# Patient Record
Sex: MALE | Race: OTHER | HISPANIC OR LATINO | Employment: UNEMPLOYED | URBAN - METROPOLITAN AREA
[De-identification: names, ages, dates, MRNs, and addresses within clinical notes are randomized per-mention and may not be internally consistent; named-entity substitution may affect disease eponyms.]

---

## 2022-10-22 ENCOUNTER — HOSPITAL ENCOUNTER (EMERGENCY)
Facility: HOSPITAL | Age: 27
Discharge: HOME | End: 2022-10-22
Attending: EMERGENCY MEDICINE
Payer: MEDICARE

## 2022-10-22 VITALS
OXYGEN SATURATION: 99 % | WEIGHT: 315 LBS | TEMPERATURE: 98.5 F | SYSTOLIC BLOOD PRESSURE: 123 MMHG | RESPIRATION RATE: 16 BRPM | DIASTOLIC BLOOD PRESSURE: 79 MMHG | HEART RATE: 75 BPM

## 2022-10-22 DIAGNOSIS — T16.1XXA FOREIGN BODY OF RIGHT EAR, INITIAL ENCOUNTER: Primary | ICD-10-CM

## 2022-10-22 RX ORDER — KETOROLAC TROMETHAMINE 30 MG/ML
15 INJECTION, SOLUTION INTRAMUSCULAR; INTRAVENOUS ONCE
Status: COMPLETED | OUTPATIENT
Start: 2022-10-22 | End: 2022-10-22

## 2022-10-22 RX ORDER — CIPROFLOXACIN AND DEXAMETHASONE 3; 1 MG/ML; MG/ML
4 SUSPENSION/ DROPS AURICULAR (OTIC) ONCE
Status: COMPLETED | OUTPATIENT
Start: 2022-10-22 | End: 2022-10-22

## 2022-10-22 RX ORDER — AMOXICILLIN AND CLAVULANATE POTASSIUM 875; 125 MG/1; MG/1
1 TABLET, FILM COATED ORAL ONCE
Status: DISCONTINUED | OUTPATIENT
Start: 2022-10-22 | End: 2022-10-22

## 2022-10-22 RX ORDER — LIDOCAINE HYDROCHLORIDE 10 MG/ML
10 INJECTION, SOLUTION EPIDURAL; INFILTRATION; INTRACAUDAL; PERINEURAL ONCE
Status: COMPLETED | OUTPATIENT
Start: 2022-10-22 | End: 2022-10-22

## 2022-10-22 RX ORDER — OFLOXACIN 3 MG/ML
1 SOLUTION/ DROPS OPHTHALMIC ONCE
Status: DISCONTINUED | OUTPATIENT
Start: 2022-10-22 | End: 2022-10-22

## 2022-10-22 RX ADMIN — KETOROLAC TROMETHAMINE 15 MG: 30 INJECTION, SOLUTION INTRAMUSCULAR; INTRAVENOUS at 05:05

## 2022-10-22 RX ADMIN — LIDOCAINE HYDROCHLORIDE 10 ML: 10 INJECTION, SOLUTION EPIDURAL; INFILTRATION; INTRACAUDAL at 05:05

## 2022-10-22 RX ADMIN — CIPROFLOXACIN AND DEXAMETHASONE 4 DROP: 3; 1 SUSPENSION/ DROPS AURICULAR (OTIC) at 05:05

## 2022-10-22 NOTE — DISCHARGE INSTRUCTIONS
Return with any new or worsening symptoms  Administer 4 drops of Ciprofloxacin/dexamethasone into the right ear 2 times daily for 7 days

## 2022-10-27 NOTE — ED PROVIDER NOTES
History  Chief Complaint   Patient presents with   • Earache     Right ear - started tonight  No pain medication prior to coming  Raquel Guevara is a 27 y o  male with no pertinent past medical history presenting with right-sided ear pain  Patient has been experiencing pain for the past 2 hours  Patient does not recall any foreign bodies  No purulent drainage  Patient does not have history of diabetes  No changes to hearing  No further complaints at this time  None       Past Medical History:   Diagnosis Date   • Asthma    • Disease of thyroid gland    • Hypertension    • Psychiatric disorder        Past Surgical History:   Procedure Laterality Date   • HAND SURGERY Right        History reviewed  No pertinent family history  I have reviewed and agree with the history as documented  E-Cigarette/Vaping     E-Cigarette/Vaping Substances     Social History     Tobacco Use   • Smoking status: Never Smoker   • Smokeless tobacco: Current User   Substance Use Topics   • Alcohol use: Yes     Comment: socially   • Drug use: Never       Review of Systems   Constitutional: Negative for chills and fever  HENT: Positive for ear pain  Negative for ear discharge and sore throat  Eyes: Negative for pain and visual disturbance  Respiratory: Negative for cough and shortness of breath  Cardiovascular: Negative for chest pain and palpitations  Gastrointestinal: Negative for abdominal pain and vomiting  Genitourinary: Negative for dysuria and hematuria  Musculoskeletal: Negative for arthralgias and back pain  Skin: Negative for color change and rash  Neurological: Negative for seizures and syncope  All other systems reviewed and are negative  Physical Exam  Physical Exam  Vitals and nursing note reviewed  Constitutional:       Appearance: He is well-developed  HENT:      Head: Normocephalic and atraumatic        Left Ear: Tympanic membrane normal       Ears:      Comments: Foreign body noted in the ear canal near tympanic membrane surrounding erythematous changes  Noted to be cockroach in patient's ear  Eyes:      Conjunctiva/sclera: Conjunctivae normal    Cardiovascular:      Rate and Rhythm: Normal rate and regular rhythm  Heart sounds: No murmur heard  Pulmonary:      Effort: Pulmonary effort is normal  No respiratory distress  Breath sounds: Normal breath sounds  Abdominal:      Palpations: Abdomen is soft  Tenderness: There is no abdominal tenderness  Musculoskeletal:      Cervical back: Neck supple  Skin:     General: Skin is warm and dry  Neurological:      Mental Status: He is alert           Vital Signs  ED Triage Vitals [10/22/22 0347]   Temperature Pulse Respirations Blood Pressure SpO2   98 7 °F (37 1 °C) 83 18 138/79 97 %      Temp Source Heart Rate Source Patient Position - Orthostatic VS BP Location FiO2 (%)   Tympanic Monitor Sitting Left arm --      Pain Score       8           Vitals:    10/22/22 0347 10/22/22 0518   BP: 138/79 123/79   Pulse: 83 75   Patient Position - Orthostatic VS: Sitting          Visual Acuity      ED Medications  Medications   ketorolac (TORADOL) injection 15 mg (15 mg Intramuscular Given 10/22/22 0505)   lidocaine (PF) (XYLOCAINE-MPF) 1 % injection 10 mL (10 mL Infiltration Given 10/22/22 0505)   ciprofloxacin-dexamethasone (CIPRODEX) 0 3-0 1 % otic suspension 4 drop (4 drops Otic Given 10/22/22 0505)       Diagnostic Studies  Results Reviewed     None                 No orders to display              Procedures  Foreign Body - Orifice    Date/Time: 10/22/2022 5:00 AM  Performed by: Sandhya Kirby PA-C  Authorized by: Sandhya Kirby PA-C     Patient location:  ED  Other Assisting Provider: Yes (comment) (Attending Dr Faye Montoya)    Consent:     Consent obtained:  Verbal    Consent given by:  Patient    Risks discussed:  Bleeding, infection, TM perforation, damage to surrounding structures, need for surgical removal, worsening of condition, incomplete removal and pain    Alternatives discussed:  No treatment, delayed treatment and referral  Universal protocol:     Procedure explained and questions answered to patient or proxy's satisfaction: yes      Patient identity confirmed:  Verbally with patient  Location:     Location:  Ear    Ear location:  R ear  Pre-procedure details:     Imaging:  None  Procedure details:     Localization method:  Direct visualization    Removal mechanism:  Alligator forceps    Procedure complexity:  Complex    Foreign bodies recovered:  1    Description:  Cockroach    Intact foreign body removal: yes    Post-procedure details:     Confirmation:  No additional foreign bodies on visualization    Patient tolerance of procedure: Tolerated well, no immediate complications             ED Course                                             MDM  Number of Diagnoses or Management Options  Foreign body of right ear, initial encounter  Diagnosis management comments: Discussed possibility of retained foreign body and risk of infection  Patient demonstrated understanding  I discussed strict return criteria with him at bedside  Risk of Complications, Morbidity, and/or Mortality  Presenting problems: moderate  Diagnostic procedures: moderate  Management options: moderate    Patient Progress  Patient progress: improved      Disposition  Final diagnoses:   Foreign body of right ear, initial encounter     Time reflects when diagnosis was documented in both MDM as applicable and the Disposition within this note     Time User Action Codes Description Comment    10/22/2022  4:34 AM India Glez Add [T16  1XXA] Foreign body of right ear, initial encounter       ED Disposition     ED Disposition   Discharge    Condition   Stable    Date/Time   Sat Oct 22, 2022  4:33 AM    Laura Ortiz discharge to home/self care                 Follow-up Information     Follow up With Specialties Details Why Contact Info Additional Information    [facility] Emergency Department Emergency Medicine Go to  If symptoms worsen [address] [id number]  [facility] Emergency Department    [employer]  Call today To schedule an appointment for follow-up with primary care provider  [phone]             There are no discharge medications for this patient  No discharge procedures on file      PDMP Review     None          ED Provider  Electronically Signed by           Shakila Boyd PA-C  10/26/22 211

## 2023-10-26 ENCOUNTER — HOSPITAL ENCOUNTER (EMERGENCY)
Facility: HOSPITAL | Age: 28
Discharge: HOME | End: 2023-10-26
Attending: EMERGENCY MEDICINE

## 2023-10-26 ENCOUNTER — APPOINTMENT (EMERGENCY)
Dept: RADIOLOGY | Facility: HOSPITAL | Age: 28
End: 2023-10-26

## 2023-10-26 VITALS
TEMPERATURE: 97.9 F | HEART RATE: 74 BPM | OXYGEN SATURATION: 97 % | SYSTOLIC BLOOD PRESSURE: 135 MMHG | DIASTOLIC BLOOD PRESSURE: 75 MMHG | WEIGHT: 315 LBS | RESPIRATION RATE: 20 BRPM

## 2023-10-26 DIAGNOSIS — L03.90 CELLULITIS: Primary | ICD-10-CM

## 2023-10-26 PROCEDURE — 73630 X-RAY EXAM OF FOOT: CPT

## 2023-10-26 PROCEDURE — 99283 EMERGENCY DEPT VISIT LOW MDM: CPT

## 2023-10-26 PROCEDURE — 99284 EMERGENCY DEPT VISIT MOD MDM: CPT

## 2023-10-26 RX ORDER — NAPROXEN 375 MG/1
375 TABLET ORAL 2 TIMES DAILY WITH MEALS
Qty: 20 TABLET | Refills: 0 | Status: SHIPPED | OUTPATIENT
Start: 2023-10-26

## 2023-10-26 RX ORDER — CEPHALEXIN 500 MG/1
500 CAPSULE ORAL EVERY 6 HOURS SCHEDULED
Qty: 20 CAPSULE | Refills: 0 | Status: SHIPPED | OUTPATIENT
Start: 2023-10-26 | End: 2023-10-31

## 2023-10-26 NOTE — ED PROVIDER NOTES
History  Chief Complaint   Patient presents with    Foot Pain     Pt came to ER with pain of first toe of left foot for three days. Pt denies trauma. Pt took tylenol at 56.      32year old male presenting today with concerns of left foot, 1st toe and medial foot, pain for the last 3 days. No fevers or chills. Atraumatic. No recent surgeries. No nausea, vomiting. States it is very painful to walk on. No pain up the leg or into the ankle. No wounds. None       Past Medical History:   Diagnosis Date    Asthma     Disease of thyroid gland     Hypertension     Psychiatric disorder        Past Surgical History:   Procedure Laterality Date    HAND SURGERY Right        History reviewed. No pertinent family history. I have reviewed and agree with the history as documented. E-Cigarette/Vaping     E-Cigarette/Vaping Substances     Social History     Tobacco Use    Smoking status: Never    Smokeless tobacco: Current   Substance Use Topics    Alcohol use: Yes     Comment: socially    Drug use: Never       Review of Systems   Constitutional:  Negative for chills and fever. HENT:  Negative for ear pain and sore throat. Eyes:  Negative for pain and visual disturbance. Respiratory:  Negative for cough and shortness of breath. Cardiovascular:  Negative for chest pain and palpitations. Gastrointestinal:  Negative for abdominal pain and vomiting. Genitourinary:  Negative for dysuria and hematuria. Musculoskeletal:  Positive for arthralgias and gait problem. Negative for back pain and joint swelling. Skin:  Positive for color change and rash. Neurological:  Negative for dizziness, seizures, syncope and headaches. All other systems reviewed and are negative. Physical Exam  Physical Exam  Vitals and nursing note reviewed. Constitutional:       General: He is not in acute distress. Appearance: He is well-developed. HENT:      Head: Normocephalic and atraumatic.    Eyes: Conjunctiva/sclera: Conjunctivae normal.   Cardiovascular:      Rate and Rhythm: Normal rate and regular rhythm. Heart sounds: No murmur heard. Pulmonary:      Effort: Pulmonary effort is normal. No respiratory distress. Breath sounds: Normal breath sounds. Abdominal:      Palpations: Abdomen is soft. Tenderness: There is no abdominal tenderness. Musculoskeletal:         General: Tenderness (erythema on dorsal aspect of foot as well as medial and on 1st toe. No wounds/bleeding. no nail infection.) present. No swelling. Cervical back: Neck supple. Skin:     General: Skin is warm and dry. Capillary Refill: Capillary refill takes less than 2 seconds. Findings: Erythema present. Neurological:      Mental Status: He is alert. Psychiatric:         Mood and Affect: Mood normal.         Vital Signs  ED Triage Vitals [10/26/23 1644]   Temperature Pulse Respirations Blood Pressure SpO2   97.9 °F (36.6 °C) 74 20 135/75 97 %      Temp Source Heart Rate Source Patient Position - Orthostatic VS BP Location FiO2 (%)   Oral Monitor Sitting Left arm --      Pain Score       --           Vitals:    10/26/23 1644   BP: 135/75   Pulse: 74   Patient Position - Orthostatic VS: Sitting         Visual Acuity      ED Medications  Medications - No data to display    Diagnostic Studies  Results Reviewed       None                   XR foot 3+ views LEFT   ED Interpretation by Rubio Mcintyre PA-C (10/26 1718)   No acute fracture or dislocation. Procedures  Procedures         ED Course                               SBIRT 22yo+      Flowsheet Row Most Recent Value   Initial Alcohol Screen: US AUDIT-C     1. How often do you have a drink containing alcohol? 2 Filed at: 10/26/2023 1643   2. How many drinks containing alcohol do you have on a typical day you are drinking? 6 Filed at: 10/26/2023 1643   3a. Male UNDER 65: How often do you have five or more drinks on one occasion?  1 Filed at: 10/26/2023 1643   3b. FEMALE Any Age, or MALE 65+: How often do you have 4 or more drinks on one occassion? 0 Filed at: 10/26/2023 1643   Audit-C Score 9 Filed at: 10/26/2023 1643   Full Alcohol Screen: US AUDIT    4. How often during the last year have you found that you were not able to stop drinking once you had started? 0 Filed at: 10/26/2023 1643   5. How often during past year have you failed to do what was normally expected of you because of drinking? 0 Filed at: 10/26/2023 1643   6. How often in past year have you needed a first drink in the morning to get yourself going after a heavy drinking session? 0 Filed at: 10/26/2023 1643   7. How often in past year have you had feeling of guilt or remorse after drinking? 0 Filed at: 10/26/2023 1643   8. How often in past year have you been unable to remember what happened night before because you had been drinking? 0 Filed at: 10/26/2023 1643   9. Have you or someone else been injured as a result of your drinking? 0 Filed at: 10/26/2023 1643   10. Has a relative, friend, doctor or other health worker been concerned about your drinking and suggested you cut down?  0 Filed at: 10/26/2023 1643   AUDIT Total Score 9 Filed at: 10/26/2023 1643   JERI: How many times in the past year have you. .. Used an illegal drug or used a prescription medication for non-medical reasons? Never Filed at: 10/26/2023 1643                      Medical Decision Making  32year old male presenting today with concerns of foot and toe pain. XR to rule out fracture. High suspicion of infection given erythema, poorly demarcated. XR did not reveal and acute fractures or dislocations. No abscess. Mild soft tissue swelling. Will treat with antibiotics and naproxen. Supplied patient with surgical shoe. Strict return precautions discussed. Patient at time of discharge well-appearing in no acute distress, all questions answered. Patient agreeable to plan.   Patient's vitals, lab/imaging results, diagnosis, and treatment plan were discussed with the patient. All new/changed medications were discussed with patient, specifically, route of administration, how often and when to take, and where they can be picked up. Strict return precautions as well as close follow up with PCP was discussed with the patient and the patient was agreeable to my recommendations. Patient verbally acknowledged understanding of the above communications. All labs reviewed and utilized in the medical decision making process (if labs were ordered). Portions of the record may have been created with voice recognition software. Occasional wrong word or "sound a like" substitutions may have occurred due to the inherent limitations of voice recognition software. Read the chart carefully and recognize, using context, where substitutions have occurred. Amount and/or Complexity of Data Reviewed  Radiology: ordered and independent interpretation performed. Risk  Prescription drug management. Disposition  Final diagnoses:   Cellulitis     Time reflects when diagnosis was documented in both MDM as applicable and the Disposition within this note       Time User Action Codes Description Comment    10/26/2023  5:19 PM Vandana Reed, [address] [G01.24] Cellulitis           ED Disposition       ED Disposition   Discharge    Condition   Stable    Date/Time   Thu Oct 26, 2023 [address] [place] [address] discharge to home/self care.                    Follow-up Information       Follow up With Specialties Details Why Contact Info Additional [address] [facility] Emergency Medicine Go to  If symptoms worsen [address] [phone] [address] [facility] Emergency Department            Patient's Medications   Discharge Prescriptions    CEPHALEXIN (KEFLEX) 500 MG CAPSULE    Take 1 capsule (500 mg total) by mouth every 6 (six) hours for 5 days       Start Date: 10/26/2023End Date: 10/31/2023       Order Dose: 500 mg       Quantity: 20 capsule    Refills: 0    NAPROXEN (NAPROSYN) 375 MG TABLET    Take 1 tablet (375 mg total) by mouth 2 (two) times a day with meals       Start Date: 10/26/2023End Date: --       Order Dose: 375 mg       Quantity: 20 tablet    Refills: 0       No discharge procedures on file.     PDMP Review       None            ED Provider  Electronically Signed by             Marc Lundberg PA-C  10/26/23 3737

## 2024-06-17 ENCOUNTER — APPOINTMENT (EMERGENCY)
Dept: RADIOLOGY | Facility: HOSPITAL | Age: 29
End: 2024-06-17
Payer: MEDICARE

## 2024-06-17 ENCOUNTER — HOSPITAL ENCOUNTER (EMERGENCY)
Facility: HOSPITAL | Age: 29
Discharge: HOME | End: 2024-06-17
Attending: INTERNAL MEDICINE | Admitting: INTERNAL MEDICINE
Payer: MEDICARE

## 2024-06-17 VITALS
SYSTOLIC BLOOD PRESSURE: 124 MMHG | WEIGHT: 315 LBS | OXYGEN SATURATION: 97 % | HEART RATE: 73 BPM | RESPIRATION RATE: 16 BRPM | TEMPERATURE: 97.6 F | DIASTOLIC BLOOD PRESSURE: 58 MMHG

## 2024-06-17 DIAGNOSIS — M79.672 ACUTE FOOT PAIN, LEFT: Primary | ICD-10-CM

## 2024-06-17 PROCEDURE — 73660 X-RAY EXAM OF TOE(S): CPT

## 2024-06-17 PROCEDURE — 96372 THER/PROPH/DIAG INJ SC/IM: CPT

## 2024-06-17 PROCEDURE — 99283 EMERGENCY DEPT VISIT LOW MDM: CPT

## 2024-06-17 PROCEDURE — 99284 EMERGENCY DEPT VISIT MOD MDM: CPT | Performed by: INTERNAL MEDICINE

## 2024-06-17 PROCEDURE — 73620 X-RAY EXAM OF FOOT: CPT

## 2024-06-17 RX ORDER — NAPROXEN 500 MG/1
500 TABLET ORAL 2 TIMES DAILY WITH MEALS
Qty: 30 TABLET | Refills: 0 | Status: SHIPPED | OUTPATIENT
Start: 2024-06-17

## 2024-06-17 RX ORDER — KETOROLAC TROMETHAMINE 30 MG/ML
30 INJECTION, SOLUTION INTRAMUSCULAR; INTRAVENOUS ONCE
Status: COMPLETED | OUTPATIENT
Start: 2024-06-17 | End: 2024-06-17

## 2024-06-17 RX ADMIN — KETOROLAC TROMETHAMINE 30 MG: 30 INJECTION, SOLUTION INTRAMUSCULAR; INTRAVENOUS at 11:31

## 2024-06-17 NOTE — DISCHARGE INSTRUCTIONS
We will call you regarding the final x-ray read of left foot and left toe.  On my read, I did not find see any fractures or dislocation.    Use Tylenol, Motrin and ice for pain relief.    Avoid activities and sports that may cause further injury, stress or pain.     If pain continues or worsens or fails to improve, follow up with Orthopedics, PCP or in the ER

## 2024-06-17 NOTE — ED PROVIDER NOTES
History  Chief Complaint   Patient presents with    Toe Pain     Non-traumatic pain to left great toe, started this am       Toe Pain    28-year-old man presents to ED evaluation of left foot and left great toe pain.  He states that started immediately prior to arrival and his wife drove him to the ED.  He reports pain starts over his left foot and radiates towards his left toe.  He denies any fall or trauma.  He denies any new activity.  He has not tried any medications or therapies for his symptoms.  No personal history or family history of gout.  Denies any joint pain.  No skin rashes.  No fevers or chills.  No other complaints or concerns.    Prior to Admission Medications   Prescriptions Last Dose Informant Patient Reported? Taking?   naproxen (NAPROSYN) 375 mg tablet   No No   Sig: Take 1 tablet (375 mg total) by mouth 2 (two) times a day with meals      Facility-Administered Medications: None       Past Medical History:   Diagnosis Date    Asthma     Disease of thyroid gland     Hypertension     Psychiatric disorder        Past Surgical History:   Procedure Laterality Date    HAND SURGERY Right        History reviewed. No pertinent family history.  I have reviewed and agree with the history as documented.    E-Cigarette/Vaping    E-Cigarette Use Current Some Day User      E-Cigarette/Vaping Substances     Social History     Tobacco Use    Smoking status: Never    Smokeless tobacco: Current   Vaping Use    Vaping status: Some Days   Substance Use Topics    Alcohol use: Yes     Comment: socially    Drug use: Never       Review of Systems   All other systems reviewed and are negative.      Physical Exam  Physical Exam  Constitutional:       Appearance: He is obese.   HENT:      Right Ear: External ear normal.      Left Ear: External ear normal.      Nose: Nose normal.      Mouth/Throat:      Mouth: Mucous membranes are moist.      Pharynx: Oropharynx is clear.   Eyes:      Conjunctiva/sclera: Conjunctivae  normal.   Cardiovascular:      Rate and Rhythm: Normal rate.      Pulses: Normal pulses.   Pulmonary:      Effort: Pulmonary effort is normal.   Abdominal:      General: There is no distension.   Musculoskeletal:      Right foot: Normal.      Left foot: Normal range of motion and normal capillary refill. Tenderness present. No laceration, bony tenderness or crepitus. Normal pulse.        Feet:    Skin:     General: Skin is warm.      Capillary Refill: Capillary refill takes less than 2 seconds.   Neurological:      General: No focal deficit present.      Mental Status: He is alert.   Psychiatric:         Mood and Affect: Mood normal.         Vital Signs  ED Triage Vitals   Temperature Pulse Respirations Blood Pressure SpO2   06/17/24 1040 06/17/24 1040 06/17/24 1040 06/17/24 1040 06/17/24 1040   97.6 °F (36.4 °C) 73 16 124/58 97 %      Temp Source Heart Rate Source Patient Position - Orthostatic VS BP Location FiO2 (%)   06/17/24 1040 06/17/24 1040 06/17/24 1040 06/17/24 1040 --   Oral Monitor Sitting Right arm       Pain Score       06/17/24 1131       10 - Worst Possible Pain           Vitals:    06/17/24 1040   BP: 124/58   Pulse: 73   Patient Position - Orthostatic VS: Sitting         Visual Acuity      ED Medications  Medications   ketorolac (TORADOL) injection 30 mg (30 mg Intramuscular Given 6/17/24 1131)       Diagnostic Studies  Results Reviewed       None                   XR toe great min 2 views LEFT    (Results Pending)   XR foot 2 vw left    (Results Pending)              Procedures  Procedures         ED Course  ED Course as of 06/17/24 1156   Mon Jun 17, 2024   1143 We will call you regarding the final x-ray read of left foot and left toe.  On my read, I did not find see any fractures or dislocation.    Use Tylenol, Motrin and ice for pain relief.    Avoid activities and sports that may cause further injury, stress or pain.     If pain continues or worsens or fails to improve, follow up with  Orthopedics, PCP or in the ER                                             Medical Decision Making  Parental diagnose includes acute fracture, acute dislocation, sprain, strain.  Less likely to be gout or pseudogout.      Take naproxen twice daily.  Follow-up with podiatry.  Continue to wear surgical shoe    Amount and/or Complexity of Data Reviewed  Radiology: ordered.    Risk  Prescription drug management.             Disposition  Final diagnoses:   Acute foot pain, left     Time reflects when diagnosis was documented in both MDM as applicable and the Disposition within this note       Time User Action Codes Description Comment    6/17/2024 11:42 AM Sophie Capone [M79.672] Acute foot pain, left           ED Disposition       ED Disposition   Discharge    Condition   Stable    Date/Time   Mon Jun 17, 2024 11:40 AM    Comment   Jewel Vargas discharge to home/self care.                   Follow-up Information    None         Patient's Medications   Discharge Prescriptions    NAPROXEN (NAPROSYN) 500 MG TABLET    Take 1 tablet (500 mg total) by mouth 2 (two) times a day with meals       Start Date: 6/17/2024 End Date: --       Order Dose: 500 mg       Quantity: 30 tablet    Refills: 0           PDMP Review       None            ED Provider  Electronically Signed by             Sophie Capone MD  06/17/24 5029

## 2024-12-10 ENCOUNTER — HOSPITAL ENCOUNTER (EMERGENCY)
Facility: HOSPITAL | Age: 29
Discharge: HOME | End: 2024-12-10
Attending: EMERGENCY MEDICINE
Payer: COMMERCIAL

## 2024-12-10 ENCOUNTER — APPOINTMENT (EMERGENCY)
Dept: RADIOLOGY | Facility: HOSPITAL | Age: 29
End: 2024-12-10
Payer: COMMERCIAL

## 2024-12-10 VITALS
DIASTOLIC BLOOD PRESSURE: 82 MMHG | OXYGEN SATURATION: 96 % | HEART RATE: 84 BPM | TEMPERATURE: 98.5 F | RESPIRATION RATE: 18 BRPM | WEIGHT: 315 LBS | SYSTOLIC BLOOD PRESSURE: 132 MMHG

## 2024-12-10 DIAGNOSIS — M79.676 TOE PAIN: Primary | ICD-10-CM

## 2024-12-10 PROCEDURE — 96372 THER/PROPH/DIAG INJ SC/IM: CPT

## 2024-12-10 PROCEDURE — 99284 EMERGENCY DEPT VISIT MOD MDM: CPT | Performed by: PHYSICIAN ASSISTANT

## 2024-12-10 PROCEDURE — 73630 X-RAY EXAM OF FOOT: CPT

## 2024-12-10 PROCEDURE — 99283 EMERGENCY DEPT VISIT LOW MDM: CPT

## 2024-12-10 RX ORDER — SPIRONOLACTONE 25 MG/1
25 TABLET ORAL DAILY
COMMUNITY
Start: 2024-01-11 | End: 2025-01-10

## 2024-12-10 RX ORDER — LEVOTHYROXINE SODIUM 137 UG/1
137 TABLET ORAL DAILY
COMMUNITY
Start: 2024-08-22

## 2024-12-10 RX ORDER — METHYLPREDNISOLONE 4 MG/1
TABLET ORAL
Qty: 21 TABLET | Refills: 0 | Status: SHIPPED | OUTPATIENT
Start: 2024-12-10

## 2024-12-10 RX ORDER — COLCHICINE 0.6 MG/1
0.6 TABLET ORAL ONCE
Status: COMPLETED | OUTPATIENT
Start: 2024-12-10 | End: 2024-12-10

## 2024-12-10 RX ORDER — NAPROXEN 500 MG/1
500 TABLET ORAL 2 TIMES DAILY WITH MEALS
Qty: 30 TABLET | Refills: 0 | Status: SHIPPED | OUTPATIENT
Start: 2024-12-10

## 2024-12-10 RX ORDER — KETOROLAC TROMETHAMINE 30 MG/ML
15 INJECTION, SOLUTION INTRAMUSCULAR; INTRAVENOUS ONCE
Status: COMPLETED | OUTPATIENT
Start: 2024-12-10 | End: 2024-12-10

## 2024-12-10 RX ORDER — COLCHICINE 0.6 MG/1
1.2 TABLET ORAL ONCE
Status: COMPLETED | OUTPATIENT
Start: 2024-12-10 | End: 2024-12-10

## 2024-12-10 RX ORDER — METOPROLOL SUCCINATE 25 MG/1
25 TABLET, EXTENDED RELEASE ORAL 2 TIMES DAILY
COMMUNITY
Start: 2024-03-13 | End: 2025-03-13

## 2024-12-10 RX ADMIN — COLCHICINE 1.2 MG: 0.6 TABLET ORAL at 01:44

## 2024-12-10 RX ADMIN — PREDNISONE 50 MG: 20 TABLET ORAL at 01:44

## 2024-12-10 RX ADMIN — COLCHICINE 0.6 MG: 0.6 TABLET ORAL at 02:58

## 2024-12-10 RX ADMIN — KETOROLAC TROMETHAMINE 15 MG: 30 INJECTION, SOLUTION INTRAMUSCULAR; INTRAVENOUS at 02:58

## 2024-12-10 NOTE — DISCHARGE INSTRUCTIONS
Take medications as directed.  Complete entire course.  Return for new or worsening complaints.  Follow-up with podiatry.

## 2024-12-10 NOTE — ED PROVIDER NOTES
Time reflects when diagnosis was documented in both MDM as applicable and the Disposition within this note       Time User Action Codes Description Comment    12/10/2024  3:34 AM Linh Raymond Add [M79.676] Toe pain           ED Disposition       ED Disposition   Discharge    Condition   Stable    Date/Time   Tue Dec 10, 2024  3:33 AM    Comment   Jewel Powers discharge to home/self care.                   Assessment & Plan       Medical Decision Making  Patient had history and physical exam consistent with acute gout patient had some relief with medications given the emergency department and was able to ambulate.  Patient had negative x-rays without obvious osseous abnormality.  Patient was given follow-up with podiatry and medications were supportive care at home.  No other focal findings on physical exam of bacterial infection or other cause.  Patient ambulated out of the department with return precautions.    Amount and/or Complexity of Data Reviewed  Radiology: ordered and independent interpretation performed.    Risk  Prescription drug management.             Medications   predniSONE tablet 50 mg (50 mg Oral Given 12/10/24 0144)   colchicine (COLCRYS) tablet 1.2 mg (1.2 mg Oral Given 12/10/24 0144)   colchicine (COLCRYS) tablet 0.6 mg (0.6 mg Oral Given 12/10/24 0258)   ketorolac (TORADOL) injection 15 mg (15 mg Intramuscular Given 12/10/24 0258)       ED Risk Strat Scores                           SBIRT 22yo+      Flowsheet Row Most Recent Value   Initial Alcohol Screen: US AUDIT-C     1. How often do you have a drink containing alcohol? 1 Filed at: 12/10/2024 0127   2. How many drinks containing alcohol do you have on a typical day you are drinking?  1 Filed at: 12/10/2024 0127   3a. Male UNDER 65: How often do you have five or more drinks on one occasion? 0 Filed at: 12/10/2024 0127   3b. FEMALE Any Age, or MALE 65+: How often do you have 4 or more drinks on one occassion? 0 Filed at: 12/10/2024  0127   Audit-C Score 2 Filed at: 12/10/2024 0127   JERI: How many times in the past year have you...    Used an illegal drug or used a prescription medication for non-medical reasons? Never Filed at: 12/10/2024 0127                            History of Present Illness       Chief Complaint   Patient presents with    Foot Pain     Pt reports pain in L foot that radiates up to ankle x3 days. Pt denies prior injury/trauma, just started hurting when he woke up. Pt states he can't walk on it without pain. Last took ibuprofen @1700 w/ no relief.        Past Medical History:   Diagnosis Date    Asthma     Disease of thyroid gland     Hypertension     Psychiatric disorder     Sleep apnea       Past Surgical History:   Procedure Laterality Date    HAND SURGERY Right       History reviewed. No pertinent family history.   Social History     Tobacco Use    Smoking status: Never    Smokeless tobacco: Current   Vaping Use    Vaping status: Some Days    Substances: Nicotine, Flavoring   Substance Use Topics    Alcohol use: Yes     Comment: socially    Drug use: Never      E-Cigarette/Vaping    E-Cigarette Use Current Some Day User       E-Cigarette/Vaping Substances    Nicotine Yes     Flavoring Yes       I have reviewed and agree with the history as documented.     29-year-old male without significant past medical history presents complaining of left great toe pain for the past 2 days.  Patient states that it is incredibly painful and denies any trauma or injury.  States that it hurts to even put socks on.  Patient tried taking ibuprofen at home without relief.  Admits to history of similar but states he followed up with podiatry and when he went to their office he no longer had pain and was not diagnosed with anything. Denies any other complaints       History provided by:  Patient   used: No        Review of Systems   Constitutional: Negative.  Negative for chills and fatigue.   HENT:  Negative for ear pain  and sore throat.    Eyes:  Negative for photophobia and redness.   Respiratory:  Negative for apnea, cough and shortness of breath.    Cardiovascular:  Negative for chest pain.   Gastrointestinal:  Negative for abdominal pain, nausea and vomiting.   Genitourinary:  Negative for dysuria.   Musculoskeletal:  Negative for arthralgias, neck pain and neck stiffness.        L great toe pain   Skin:  Negative for rash.   Neurological:  Negative for dizziness, tremors, syncope and weakness.   Psychiatric/Behavioral:  Negative for suicidal ideas.            Objective       ED Triage Vitals   Temperature Pulse Blood Pressure Respirations SpO2 Patient Position - Orthostatic VS   12/10/24 0058 12/10/24 0056 12/10/24 0056 12/10/24 0056 12/10/24 0056 12/10/24 0056   98.5 °F (36.9 °C) 84 132/82 18 96 % Sitting      Temp Source Heart Rate Source BP Location FiO2 (%) Pain Score    12/10/24 0058 12/10/24 0056 12/10/24 0056 -- 12/10/24 0258    Tympanic Monitor Right arm  10 - Worst Possible Pain      Vitals      Date and Time Temp Pulse SpO2 Resp BP Pain Score FACES Pain Rating User   12/10/24 0258 -- -- -- -- -- 10 - Worst Possible Pain -- EY   12/10/24 0058 98.5 °F (36.9 °C) -- -- -- -- -- -- SD   12/10/24 0056 -- 84 96 % 18 132/82 -- -- SD            Physical Exam  Constitutional:       General: He is not in acute distress.     Appearance: He is well-developed. He is not diaphoretic.   Eyes:      Pupils: Pupils are equal, round, and reactive to light.   Cardiovascular:      Rate and Rhythm: Normal rate and regular rhythm.   Pulmonary:      Effort: Pulmonary effort is normal. No respiratory distress.      Breath sounds: Normal breath sounds.   Abdominal:      General: Bowel sounds are normal. There is no distension.      Palpations: Abdomen is soft.   Musculoskeletal:         General: Normal range of motion.      Cervical back: Normal range of motion and neck supple.      Comments: Left great toe without obvious abnormality.   Significant tenderness on palpation.  Patient screams in pain with light palpation.  Cap refill less than 2 seconds.  Sensation intact distally.  Dorsalis pedis pulse 2+   Skin:     General: Skin is warm and dry.   Neurological:      Mental Status: He is alert and oriented to person, place, and time.         Results Reviewed       None            XR foot 3+ views LEFT   ED Interpretation by Linh Raymond PA-C (12/10 0220)   No obvious osseous abnormality           Procedures    ED Medication and Procedure Management   Prior to Admission Medications   Prescriptions Last Dose Informant Patient Reported? Taking?   levothyroxine 137 mcg tablet   Yes Yes   Sig: Take 137 mcg by mouth daily   metoprolol succinate (TOPROL-XL) 25 mg 24 hr tablet  Self Yes Yes   Sig: Take 25 mg by mouth 2 (two) times a day   naproxen (NAPROSYN) 375 mg tablet   No No   Sig: Take 1 tablet (375 mg total) by mouth 2 (two) times a day with meals   naproxen (Naprosyn) 500 mg tablet   No No   Sig: Take 1 tablet (500 mg total) by mouth 2 (two) times a day with meals   spironolactone (ALDACTONE) 25 mg tablet   Yes Yes   Sig: Take 25 mg by mouth daily      Facility-Administered Medications: None     Discharge Medication List as of 12/10/2024  3:36 AM        START taking these medications    Details   methylPREDNISolone 4 MG tablet therapy pack Use as directed on package, Normal      !! naproxen (Naprosyn) 500 mg tablet Take 1 tablet (500 mg total) by mouth 2 (two) times a day with meals, Starting Tue 12/10/2024, Normal       !! - Potential duplicate medications found. Please discuss with provider.        CONTINUE these medications which have NOT CHANGED    Details   levothyroxine 137 mcg tablet Take 137 mcg by mouth daily, Starting Thu 8/22/2024, Historical Med      metoprolol succinate (TOPROL-XL) 25 mg 24 hr tablet Take 25 mg by mouth 2 (two) times a day, Starting Wed 3/13/2024, Until Thu 3/13/2025, Historical Med      spironolactone  (ALDACTONE) 25 mg tablet Take 25 mg by mouth daily, Starting Thu 1/11/2024, Until Fri 1/10/2025, Historical Med      !! naproxen (NAPROSYN) 375 mg tablet Take 1 tablet (375 mg total) by mouth 2 (two) times a day with meals, Starting Thu 10/26/2023, Normal      !! naproxen (Naprosyn) 500 mg tablet Take 1 tablet (500 mg total) by mouth 2 (two) times a day with meals, Starting Mon 6/17/2024, Normal       !! - Potential duplicate medications found. Please discuss with provider.        No discharge procedures on file.  ED SEPSIS DOCUMENTATION   Time reflects when diagnosis was documented in both MDM as applicable and the Disposition within this note       Time User Action Codes Description Comment    12/10/2024  3:34 AM Linh Raymond Add [M79.676] Toe pain                  Linh Raymond PA-C  12/10/24 0412